# Patient Record
Sex: MALE | Race: BLACK OR AFRICAN AMERICAN | NOT HISPANIC OR LATINO | URBAN - METROPOLITAN AREA
[De-identification: names, ages, dates, MRNs, and addresses within clinical notes are randomized per-mention and may not be internally consistent; named-entity substitution may affect disease eponyms.]

---

## 2020-06-01 NOTE — PATIENT DISCUSSION
Patient understands condition, prognosis and need for follow up care. Oriented - self; Oriented - place; Oriented - time

## 2021-11-23 NOTE — PATIENT DISCUSSION
Discussed lid hygiene, warm compress and eyelid wash. Subjective:      Patient ID: Esme Jones is a 23 y.o. male    HPI: Acute for cough    TELEHEALTH EVALUATION -- Audio/Visual (During [facility] public health emergency)    Patient identification was verified at the start of the visit: Yes    Services were provided through a video synchronous discussion virtually to substitute for in-person clinic visit. Patient and provider were located at their individual homes. Patient has requested an audio/video evaluation for the following concern(s):    Chief Complaint   Patient presents with    Cough       Onset of 3 days ST, congestion, nasal congestion and sinus headaches. Sickness has been going through the family    Denies fever, chills or body aches. OTC: Sudafed, Nyquil. Patient Active Problem List   Diagnosis    Appendicitis       Review of Systems   Constitutional: Positive for fatigue. Negative for chills and fever. HENT: Positive for congestion, postnasal drip, rhinorrhea, sinus pressure and sore throat. Respiratory: Negative for cough and shortness of breath. Cardiovascular: Negative for chest pain. Gastrointestinal: Negative for abdominal pain and nausea. Skin: Negative for rash. Neurological: Positive for headaches. Negative for dizziness and light-headedness. Psychiatric/Behavioral: Negative. Objective:   Physical Exam  Constitutional:       General: He is not in acute distress. Appearance: He is not ill-appearing. Pulmonary:      Effort: Pulmonary effort is normal. No respiratory distress. Neurological:      Mental Status: He is alert and oriented to person, place, and time. Psychiatric:         Mood and Affect: Mood normal.         Behavior: Behavior normal.         Assessment:       Diagnosis Orders   1. Rhinosinusitis  doxycycline hyclate (VIBRA-TABS) 100 MG tablet   2.  Sore throat  doxycycline hyclate (VIBRA-TABS) 100 MG tablet       Plan:     Orders as above   Fluids and rest  RTO if symptoms worsen or stay the same                 Due to this being a TeleHealth encounter (During [facility]-88 public health emergency), evaluation of the following organ systems was limited: Vitals/Constitutional/EENT/Resp/CV/GI//MS/Neuro/Skin/Heme-Lymph-Imm. Pursuant to the emergency declaration under the [address] [place] [address], [address] [place] [address] and the [employer] Act, this Virtual Visit was conducted with patient's (and/or legal guardian's) consent, to reduce the patient's risk of exposure to COVID-19 and provide necessary medical care. The patient (and/or legal guardian) has also been advised to contact this office for worsening conditions or problems, and seek emergency medical treatment and/or call 911 if deemed necessary. --SUZY Funes - CNP on 11/23/2021 at 8:15 AM    An electronic signature was used to authenticate this note.      11/23/2021

## 2022-02-02 ENCOUNTER — NEW PATIENT (OUTPATIENT)
Dept: URBAN - METROPOLITAN AREA CLINIC 17 | Facility: CLINIC | Age: 77
End: 2022-02-02

## 2022-02-02 DIAGNOSIS — H40.1132: ICD-10-CM

## 2022-02-02 DIAGNOSIS — E11.3592: ICD-10-CM

## 2022-02-02 DIAGNOSIS — H34.8322: ICD-10-CM

## 2022-02-02 DIAGNOSIS — H35.371: ICD-10-CM

## 2022-02-02 PROCEDURE — 92015 DETERMINE REFRACTIVE STATE: CPT

## 2022-02-02 PROCEDURE — 99204 OFFICE O/P NEW MOD 45 MIN: CPT

## 2022-02-02 PROCEDURE — 92020 GONIOSCOPY: CPT

## 2022-02-02 PROCEDURE — 76514 ECHO EXAM OF EYE THICKNESS: CPT

## 2022-02-02 PROCEDURE — 92133 CPTRZD OPH DX IMG PST SGM ON: CPT

## 2022-02-02 ASSESSMENT — TONOMETRY
OS_IOP_MMHG: 28
OD_IOP_MMHG: 25
OD_IOP_MMHG: 31
OS_IOP_MMHG: 21

## 2022-02-02 ASSESSMENT — VISUAL ACUITY
OS_SC: 20/25
OD_SC: 20/70
OD_PH: 20/30-2

## 2022-02-02 ASSESSMENT — PACHYMETRY
OS_CT_UM: 535
OD_CT_UM: 508

## 2022-03-30 ENCOUNTER — ESTABLISHED PATIENT (OUTPATIENT)
Dept: URBAN - METROPOLITAN AREA CLINIC 17 | Facility: CLINIC | Age: 77
End: 2022-03-30

## 2022-03-30 DIAGNOSIS — H40.1132: ICD-10-CM

## 2022-03-30 PROCEDURE — 99213 OFFICE O/P EST LOW 20 MIN: CPT

## 2022-03-30 RX ORDER — LATANOPROST 50 UG/ML: 1 SOLUTION/ DROPS OPHTHALMIC EVERY EVENING

## 2022-03-30 ASSESSMENT — VISUAL ACUITY
OS_CC: 20/25
OD_CC: 20/60

## 2022-03-30 ASSESSMENT — TONOMETRY
OS_IOP_MMHG: 20
OD_IOP_MMHG: 24

## 2022-03-30 NOTE — PATIENT DISCUSSION
The IOP borderline range ou.  pt did not start latanoprost after last visit.  Instructed to start latanoprost qhs ou today.

## 2022-05-23 ENCOUNTER — ESTABLISHED PATIENT (OUTPATIENT)
Dept: URBAN - METROPOLITAN AREA CLINIC 17 | Facility: CLINIC | Age: 77
End: 2022-05-23

## 2022-05-23 DIAGNOSIS — E11.3592: ICD-10-CM

## 2022-05-23 DIAGNOSIS — H40.1132: ICD-10-CM

## 2022-05-23 DIAGNOSIS — H35.371: ICD-10-CM

## 2022-05-23 DIAGNOSIS — H34.8322: ICD-10-CM

## 2022-05-23 PROCEDURE — 92134 CPTRZ OPH DX IMG PST SGM RTA: CPT

## 2022-05-23 PROCEDURE — 92014 COMPRE OPH EXAM EST PT 1/>: CPT

## 2022-05-23 ASSESSMENT — VISUAL ACUITY
OS_CC: 20/25-1
OD_PH: 20/25
OD_CC: 20/40

## 2022-05-23 ASSESSMENT — TONOMETRY
OD_IOP_MMHG: 22
OS_IOP_MMHG: 31
OS_IOP_MMHG: 23
OD_IOP_MMHG: 31

## 2022-05-23 NOTE — PATIENT DISCUSSION
Discussed with the patient the importance of good control of their blood sugar, blood pressure, cholesterol, diet, exercise, weight, under the guidance of their diabetic doctor to prevent/halt diabetic retinopathy. Patient educated on condition.

## 2022-12-07 ENCOUNTER — ESTABLISHED PATIENT (OUTPATIENT)
Dept: URBAN - METROPOLITAN AREA CLINIC 17 | Facility: CLINIC | Age: 77
End: 2022-12-07

## 2022-12-07 PROCEDURE — 99213 OFFICE O/P EST LOW 20 MIN: CPT

## 2022-12-07 ASSESSMENT — TONOMETRY
OS_IOP_MMHG: 24
OD_IOP_MMHG: 20

## 2022-12-07 ASSESSMENT — VISUAL ACUITY
OD_CC: 20/40
OS_CC: 20/30+1

## 2022-12-07 NOTE — PATIENT DISCUSSION
EXPLAINED AT LENGTH TO PATIENT THE REASON FOR BEING ON THE DROPS, THE EFFECTS THEY HAVE ON HIS VISION AND THE EXPECTATIONS OF HIS FUTURE VISION IF HE CONTINUES WITH THE DROPS.

## 2022-12-07 NOTE — PATIENT DISCUSSION
EXPLAINED TO THE PATIENT THAT THE DROPS ARE TO HELP KEEP HIS EYE PRESSURE DOWN TO HELP KEEP WHAT VISION HE HAS, AND NOT TO REALLY IMPROVE HIS VISION.

## 2022-12-07 NOTE — PATIENT DISCUSSION
IOP is still elevated discussed the options of adding Timolol in the AM VS SLT OU. Patient elects to have Dr. Stephanie Walsh decided which treatment is best for him which will be SLT OU.

## 2024-02-12 ENCOUNTER — ESTABLISHED PATIENT (OUTPATIENT)
Dept: URBAN - METROPOLITAN AREA CLINIC 17 | Facility: CLINIC | Age: 79
End: 2024-02-12

## 2024-02-12 DIAGNOSIS — H35.3111: ICD-10-CM

## 2024-02-12 DIAGNOSIS — E11.3592: ICD-10-CM

## 2024-02-12 DIAGNOSIS — H35.373: ICD-10-CM

## 2024-02-12 DIAGNOSIS — H40.1132: ICD-10-CM

## 2024-02-12 DIAGNOSIS — H34.8322: ICD-10-CM

## 2024-02-12 PROCEDURE — 99214 OFFICE O/P EST MOD 30 MIN: CPT

## 2024-02-12 PROCEDURE — 92134 CPTRZ OPH DX IMG PST SGM RTA: CPT

## 2024-02-12 PROCEDURE — 92015 DETERMINE REFRACTIVE STATE: CPT

## 2024-02-12 ASSESSMENT — VISUAL ACUITY
OS_CC: 20/40-1
OD_CC: 20/50-2

## 2024-02-12 ASSESSMENT — TONOMETRY
OD_IOP_MMHG: 12
OS_IOP_MMHG: 12